# Patient Record
Sex: FEMALE | Race: WHITE | Employment: UNEMPLOYED | URBAN - METROPOLITAN AREA
[De-identification: names, ages, dates, MRNs, and addresses within clinical notes are randomized per-mention and may not be internally consistent; named-entity substitution may affect disease eponyms.]

---

## 2018-07-05 NOTE — BH NOTE
Patient called and stated that her PCP was referring her back to our outpatient program. She states that she was doing well for a while, but she states that she has began \" obsessing\" over things. Patient scheduled for an intake appointment tomorrow.

## 2018-07-06 ENCOUNTER — HOSPITAL ENCOUNTER (OUTPATIENT)
Dept: PSYCHIATRY | Age: 33
Discharge: HOME | End: 2018-07-07
Attending: PSYCHIATRY & NEUROLOGY | Admitting: PSYCHIATRY & NEUROLOGY

## 2018-07-06 ASSESSMENT — LIFESTYLE VARIABLES: HISTORY_ALCOHOL_USE: NO

## 2018-07-06 NOTE — BH NOTE
[id number] [facility]  Outpatient Services  Diagnostic Assessment Note      Date: 7/6/18  Start Time: 1:00  End Time:  2:00    Chief Complaint:  Insomnia, ruminating, paranoia, and poor judgement. History of Illness (duration, frequency, intensity): PT reports that experienced symptoms has increased over the last two weeks. Treatment Hx: Hospitalized once in 2016 and and previously was in our outpatient PHP program January 2017. Pt sees an individual therapist and psychiatrist.     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 Trauma/Abuse Hx:  n/a  AOD Hx:  n/a    Notes:  Pt reports that her psychiatrist has suggested a higher level of care due to her recent symptoms. Pt was hesitant on beginning treatment but has decided to attend IOP 2 full days a week for 4 weeks.    Screening/Assessment Results (if applicable):  n/a    Provisional DSM-5 Diagnosis:  Bi polar I disorder     Mental Status Examination:    Appearance:  well-appearing  Behavior/Motor:  no abnormalities noted  Attitude toward examiner:  cooperative  Speech:  normal  Thought Process/Content: Logical  Affect:  mood congruent  Mood: euthymic  Level of consciousness:  within normal limits  Insight & Judgement: age appropriate   Cognition:  oriented to person, place, and time  Endings: None Reported      Discipline Responsible: /Counselor      Signature:  EULALIA Perdomo

## 2018-07-10 ENCOUNTER — HOSPITAL ENCOUNTER (OUTPATIENT)
Dept: PSYCHIATRY | Age: 33
Discharge: HOME | End: 2018-07-11
Admitting: PSYCHIATRY & NEUROLOGY

## 2018-07-10 PROCEDURE — 90791 PSYCH DIAGNOSTIC EVALUATION: CPT | Performed by: PSYCHIATRY & NEUROLOGY

## 2018-07-10 NOTE — BH NOTE
[address]                  [address] [address]                              PSYCHIATRIC EVALUATION    PATIENT NAME: Rafael Gonzalez             :        1985  MED REC NO:   [id number]                          ROOM:  ACCOUNT NO:   [de-identified]                          ADMIT DATE: 2018  PROVIDER:     Asmita Wong. Faheem Gonzalez MD    ADMISSION TO [facility]:  07/10/2018    CHIEF COMPLAINT:  The patient is a 40-year-old white female who presented  to [facility] today after she had been discharged from the Intensive Outpatient  Program on 2016 after 14 days. She presents after she stated that  she was having some more difficulties over the past month. She states she  has been feeling more agitated, sleep disrupted and more obsessive at  night. She states that she has been focusing on various things in her  life. I spoke with her psychiatrist last week and this week, and they put  her back on Risperdal a week ago and she feels like her mind is now more  settled and she is sleeping better. She has also been off work for 1 week  and has been struggling with her moods overall. She states she does want  things to get out of control like they were on her last stay in the  hospital in [facility] and feels like she may have intervened quickly enough to  decrease her symptoms. She was in our [facility] program and [facility] in 2016 and then was placed in [facility]  following that. At one point, she was hospitalized for 8 days from  2016 to 2016 and then from 2016 to 2016 for  depression with catatonic features and suicidal ideation. She was having  fatigue, depression, anxiety, and paranoia, which began 6 weeks after she  moved from [place] to [place]. 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 She came here to this area with her  and two children  and had a difficult time adapting. She has also gained weight.   She was  unable to make and concentration was good. Able  to recall three objects immediately. DIAGNOSES:  AXIS I:  Bipolar affective disorder, depressed, moderate without psychosis. AXIS II:  Deferred. AXIS III:  Unremarkable. AXIS IV:  Moderate. AXIS V:  60. PLAN:  1. We will continue with Risperdal 3 mg at night. 2.  Lamictal 200 mg daily. 3.  Trazodone 50 to 100 mg at night as needed. We will follow up with  psychiatrist for medication management. 4.  Continue in IOP. 5.  Discharge after 2 or 3 weeks. 6.  Plan is to go back to work on 07/23/2018. 7.  Continue to observe for any further decompensation.         Rodolfo Vargas MD    D: 07/10/2018 12:57:52       T: 07/10/2018 13:05:30     [email or web address]  Job#: [id number]     Doc#: [id number]    CC:

## 2018-07-10 NOTE — PLAN OF CARE
Problem: Altered Mood, Depressive Behavior  Goal: LTG-Able to verbalize and/or display a decrease in depressive symptoms  Outcome: Ongoing                                                                      Group Therapy Note    Date: 7/10/2018  Start Time: 11:00am  End Time:  12:15pm  Number of Participants: 4     Type of Group: Cognitive Skills     Patient's Goal:  Pts were directed to engage in a group directive focusing on dealing with shame, vulnerability and self-expression. Pts were encouraged to complete the sentences \"I would not like to be perceived as. Jenet Lanius Jenet Lanius \", \"I would like to be perceived as. Jenet Lanius Jenet Lanius \", \"I feel like a failure when. Jenet Lanius Jenet Lanius \" and \"1 of my strengths includes. Jenet Lanius Jenet Lanius \" Pts were then encouraged to engage in group discussion regarding these topics.      Notes:  Ajay Montoya displayed positive ability to engage in directive. She was able to identify answers to each of the questions and displayed positive effort to be an active member of the group. Status After Intervention:  Improved    Participation Level:  Active Listener and Interactive    Participation Quality: Appropriate, Attentive, Sharing and Supportive      Speech:  normal and hesitant      Thought Process/Content: Logical  Linear      Affective Functioning: Blunted      Mood: anxious and depressed      Level of consciousness:  Alert, Oriented x4 and Attentive      Response to Learning: Able to verbalize current knowledge/experience, Able to verbalize/acknowledge new learning, Able to retain information, Capable of insight and Progressing to goal      Endings: None Reported    Modes of Intervention: Education, Support, Exploration, Clarifying, Problem-solving, Activity and Limit-setting      Discipline Responsible: Psychoeducational Specialist      Signature:  Malissa Zuniga MS, ATR-BC

## 2018-07-11 ENCOUNTER — HOSPITAL ENCOUNTER (OUTPATIENT)
Dept: PSYCHIATRY | Age: 33
Discharge: HOME | End: 2018-07-12
Admitting: PSYCHIATRY & NEUROLOGY

## 2018-07-13 ENCOUNTER — HOSPITAL ENCOUNTER (OUTPATIENT)
Dept: PSYCHIATRY | Age: 33
Discharge: HOME | End: 2018-07-14
Admitting: PSYCHIATRY & NEUROLOGY

## 2018-07-13 ENCOUNTER — HOSPITAL ENCOUNTER (INPATIENT)
Dept: PSYCHIATRY | Age: 33
LOS: 3 days | Discharge: HOME | DRG: 885 | End: 2018-07-16
Attending: PSYCHIATRY & NEUROLOGY | Admitting: PSYCHIATRY & NEUROLOGY
Payer: COMMERCIAL

## 2018-07-13 DIAGNOSIS — F23 ACUTE PSYCHOSIS (HCC): ICD-10-CM

## 2018-07-13 PROCEDURE — 9990 CHARGE CONVERSION

## 2018-07-13 PROCEDURE — 99221 1ST HOSP IP/OBS SF/LOW 40: CPT | Performed by: PHYSICIAN ASSISTANT

## 2018-07-13 RX ORDER — DOCUSATE SODIUM 100 MG/1
100 CAPSULE, LIQUID FILLED ORAL DAILY PRN
Status: DISCONTINUED | OUTPATIENT
Start: 2018-07-13 | End: 2018-07-16 | Stop reason: HOSPADM

## 2018-07-13 RX ORDER — ONDANSETRON 4 MG/1
4 TABLET, ORALLY DISINTEGRATING ORAL EVERY 8 HOURS PRN
Status: DISCONTINUED | OUTPATIENT
Start: 2018-07-13 | End: 2018-07-16 | Stop reason: HOSPADM

## 2018-07-13 RX ORDER — ACETAMINOPHEN 325 MG/1
650 TABLET ORAL EVERY 4 HOURS PRN
Status: DISCONTINUED | OUTPATIENT
Start: 2018-07-13 | End: 2018-07-16 | Stop reason: HOSPADM

## 2018-07-13 RX ORDER — IBUPROFEN 400 MG/1
400 TABLET ORAL EVERY 6 HOURS PRN
Status: DISCONTINUED | OUTPATIENT
Start: 2018-07-13 | End: 2018-07-16 | Stop reason: HOSPADM

## 2018-07-13 RX ORDER — MAGNESIUM HYDROXIDE/ALUMINUM HYDROXICE/SIMETHICONE 120; 1200; 1200 MG/30ML; MG/30ML; MG/30ML
30 SUSPENSION ORAL EVERY 6 HOURS PRN
Status: DISCONTINUED | OUTPATIENT
Start: 2018-07-13 | End: 2018-07-16 | Stop reason: HOSPADM

## 2018-07-13 RX ORDER — DOCUSATE SODIUM 100 MG/1
100 CAPSULE, LIQUID FILLED ORAL DAILY
Status: DISCONTINUED | OUTPATIENT
Start: 2018-07-13 | End: 2018-07-13

## 2018-07-13 RX ORDER — DIPHENHYDRAMINE HCL 25 MG
50 TABLET ORAL NIGHTLY PRN
Status: DISCONTINUED | OUTPATIENT
Start: 2018-07-13 | End: 2018-07-16 | Stop reason: HOSPADM

## 2018-07-13 RX ORDER — BISACODYL 10 MG
10 SUPPOSITORY, RECTAL RECTAL DAILY PRN
Status: DISCONTINUED | OUTPATIENT
Start: 2018-07-13 | End: 2018-07-16 | Stop reason: HOSPADM

## 2018-07-13 RX ORDER — TRAZODONE HYDROCHLORIDE 50 MG/1
50 TABLET ORAL NIGHTLY PRN
Status: DISCONTINUED | OUTPATIENT
Start: 2018-07-13 | End: 2018-07-16 | Stop reason: HOSPADM

## 2018-07-13 RX ORDER — HALOPERIDOL 5 MG/ML
5 INJECTION INTRAMUSCULAR EVERY 6 HOURS PRN
Status: DISCONTINUED | OUTPATIENT
Start: 2018-07-13 | End: 2018-07-16 | Stop reason: HOSPADM

## 2018-07-13 RX ORDER — LAMOTRIGINE 100 MG/1
200 TABLET ORAL NIGHTLY
Status: DISCONTINUED | OUTPATIENT
Start: 2018-07-13 | End: 2018-07-16 | Stop reason: HOSPADM

## 2018-07-13 RX ORDER — HYDROXYZINE PAMOATE 50 MG/1
50 CAPSULE ORAL 3 TIMES DAILY PRN
Status: DISCONTINUED | OUTPATIENT
Start: 2018-07-13 | End: 2018-07-16 | Stop reason: HOSPADM

## 2018-07-13 RX ORDER — RISPERIDONE 1 MG/1
1 TABLET, FILM COATED ORAL NIGHTLY
Status: DISCONTINUED | OUTPATIENT
Start: 2018-07-13 | End: 2018-07-13

## 2018-07-13 RX ORDER — LAMOTRIGINE 100 MG/1
200 TABLET ORAL DAILY
Status: DISCONTINUED | OUTPATIENT
Start: 2018-07-14 | End: 2018-07-13

## 2018-07-13 RX ADMIN — LAMOTRIGINE 200 MG: 100 TABLET ORAL at 20:44

## 2018-07-13 RX ADMIN — RISPERIDONE 3 MG: 2 TABLET ORAL at 20:44

## 2018-07-13 ASSESSMENT — SLEEP AND FATIGUE QUESTIONNAIRES
DO YOU HAVE DIFFICULTY SLEEPING: NO
DO YOU USE A SLEEP AID: NO
AVERAGE NUMBER OF SLEEP HOURS: 8

## 2018-07-13 ASSESSMENT — LIFESTYLE VARIABLES: HISTORY_ALCOHOL_USE: NO

## 2018-07-13 NOTE — PAYOR INFORMATION
Patient Joy Eis:  [de-identified]  Primary AUTH/CERT:    [facility] Name:   [employer] HMO/GE/NEWHEALTH/PREFE/POS  Primary Insurance Plan Name:  [employer] POS  Primary Insurance Group Number:    Primary Insurance Plan Type: [employer] Policy Number:  [id number]

## 2018-07-13 NOTE — PLAN OF CARE
Problem: Altered Mood, Manic Behavior:  Goal: Ability to interact with others will improve  Ability to interact with others will improve   Outcome: Ongoing            Group Therapy Note     Date: 7/13/2018  Start Time: 10:00am  End Time:  11:00am  Number of Participants: 7     Type of Group: Psychoeducation     Psychoeducation/ Recreation Therapy     Patient's Goal: To achieve a relaxed state through aroma therapy and progressive muscle relaxation.      Notes: Pt was able to achieve a relaxed state. Pt is encouraged to use practices skills to manage anxiety symptoms. Status After Intervention:  Improved    Participation Level:  Active Listener and Interactive    Participation Quality: Attentive, Sharing and Supportive      Speech:  normal      Thought Process/Content: Logical      Affective Functioning: Congruent      Mood: depressed      Level of consciousness:  Alert and Oriented x4      Response to Learning: Able to retain information and Capable of insight      Endings: None Reported    Modes of Intervention: Education, Support, Socialization and Activity      Discipline Responsible: Psychoeducational Specialist      Signature:  Andre Valencia

## 2018-07-13 NOTE — BH NOTE
Patient arrived to group and has been restless, walking in and out of group. Patient stated that she has a headache. Upon speaking to patient she stated that she has been having increased anxiety and stated that she had a \" bad night last night\". She stated that last night she was \"crazy\". This writer spoke with patients  who stated that she was \" acutely psychotic\". He stated that Scott Ang believed that he was possessed by a demon and stated that her \"Soul \" . Patient was restless. He states that they contacted her psychiatrist and that they increased her Risperdal from 2 mg at HS to 3 mg at HS. He stated however that he gave her an additional mg yesterday for a total of 4 mg. He also states that he gave her 50 mg of benadryl, 200 mg of Lamictal. Patients  stated that he was trying to get her to rest.     Dr. Josue Tineo aware and patient is to be admitted when a bed is available.

## 2018-07-13 NOTE — PLAN OF CARE
Problem: Altered Mood, Manic Behavior:  Goal: Ability to interact with others will improve  Ability to interact with others will improve  Outcome: Ongoing                                                                      Group Therapy Note    Date: 7/13/2018  Start Time: 10:00am  End Time:  11:00am  Number of Participants: 8    Type of Group: Relapse Prevention    Patient's Goal:  To identify upcoming events or situations that may trigger a relapse of MH symptoms and/or substance abuse. Notes:  Pt minimally participated in group discussion and exploration. Pt was observed to be very restless and expressed that she had feeling of dread, but could not identify the source. During the group, pt asked if she was allowed to eat her snack and was observed to be eating and offering imaginary peanuts to other group members. Status After Intervention:  Decompensated    Participation Level:  Active Listener and Minimal    Participation Quality: Attentive and Inappropriate      Speech:  pressured      Thought Process/Content: Hallucinating      Affective Functioning: Incongruent      Mood: anxious      Level of consciousness:  Alert, Oriented x4 and Preoccupied      Response to Learning: Able to verbalize/acknowledge new learning, Able to retain information and Capable of insight      Endings: None Reported    Modes of Intervention: Education, Support, Socialization and Exploration      Discipline Responsible: /Counselor      Signature:  HALEY Mckeon

## 2018-07-13 NOTE — BH NOTE
Group Therapy Note     Date: 7/13/2018  Start Time: 8:30 am  End Time:  9:45 am  Number of Participants: 8     Type of Group: Psychotherapy     Patient's Goal:  To reduce depressive symptoms by participating in group discussion     Notes:  Patient did not participate in group discussion. Patient left the group after approximately 5 minutes. Patient appeared anxious and was unable to sit still.   Nursing staff informed    Status After Intervention:  Unchanged    Participation Level: None    Participation Quality: Resistant      Speech:  normal      Thought Process/Content: Linear      Affective Functioning: Congruent      Mood: anxious and depressed      Level of consciousness:  Alert      Response to Learning: Capable of insight      Endings: None Reported    Modes of Intervention: Education, Support, Socialization, Exploration, Clarifying and Problem-solving      Discipline Responsible: /Counselor      Signature:  HALEY Chatman

## 2018-07-14 LAB
A/G RATIO: 1.4 (ref 1.1–2.2)
ALBUMIN SERPL-MCNC: 4.3 G/DL (ref 3.4–5)
ALP BLD-CCNC: 71 U/L (ref 40–129)
ALT SERPL-CCNC: 11 U/L (ref 10–40)
ANION GAP SERPL CALCULATED.3IONS-SCNC: 11 MMOL/L (ref 3–16)
AST SERPL-CCNC: 15 U/L (ref 15–37)
BASOPHILS ABSOLUTE: 0 K/UL (ref 0–0.2)
BASOPHILS RELATIVE PERCENT: 0.2 %
BILIRUB SERPL-MCNC: <0.2 MG/DL (ref 0–1)
BUN BLDV-MCNC: 8 MG/DL (ref 7–20)
CALCIUM SERPL-MCNC: 9.7 MG/DL (ref 8.3–10.6)
CHLORIDE BLD-SCNC: 100 MMOL/L (ref 99–110)
CO2: 26 MMOL/L (ref 21–32)
CREAT SERPL-MCNC: 0.6 MG/DL (ref 0.6–1.1)
EOSINOPHILS ABSOLUTE: 0.1 K/UL (ref 0–0.6)
EOSINOPHILS RELATIVE PERCENT: 1.5 %
GFR AFRICAN AMERICAN: >60
GFR NON-AFRICAN AMERICAN: >60
GLOBULIN: 3 G/DL
GLUCOSE BLD-MCNC: 115 MG/DL (ref 70–99)
HCT VFR BLD CALC: 36.1 % (ref 36–48)
HEMOGLOBIN: 12.2 G/DL (ref 12–16)
LYMPHOCYTES ABSOLUTE: 2 K/UL (ref 1–5.1)
LYMPHOCYTES RELATIVE PERCENT: 26.5 %
MCH RBC QN AUTO: 28.1 PG (ref 26–34)
MCHC RBC AUTO-ENTMCNC: 33.7 G/DL (ref 31–36)
MCV RBC AUTO: 83.2 FL (ref 80–100)
MONOCYTES ABSOLUTE: 0.6 K/UL (ref 0–1.3)
MONOCYTES RELATIVE PERCENT: 7.8 %
NEUTROPHILS ABSOLUTE: 4.9 K/UL (ref 1.7–7.7)
NEUTROPHILS RELATIVE PERCENT: 64 %
PDW BLD-RTO: 14.1 % (ref 12.4–15.4)
PLATELET # BLD: 299 K/UL (ref 135–450)
PMV BLD AUTO: 7.9 FL (ref 5–10.5)
POTASSIUM REFLEX MAGNESIUM: 4.5 MMOL/L (ref 3.5–5.1)
RBC # BLD: 4.35 M/UL (ref 4–5.2)
SODIUM BLD-SCNC: 137 MMOL/L (ref 136–145)
TOTAL PROTEIN: 7.3 G/DL (ref 6.4–8.2)
WBC # BLD: 7.7 K/UL (ref 4–11)

## 2018-07-14 PROCEDURE — 82306 VITAMIN D 25 HYDROXY: CPT

## 2018-07-14 PROCEDURE — 84443 ASSAY THYROID STIM HORMONE: CPT

## 2018-07-14 PROCEDURE — 80053 COMPREHEN METABOLIC PANEL: CPT

## 2018-07-14 PROCEDURE — 85025 COMPLETE CBC W/AUTO DIFF WBC: CPT

## 2018-07-14 PROCEDURE — 99223 1ST HOSP IP/OBS HIGH 75: CPT | Performed by: PSYCHIATRY & NEUROLOGY

## 2018-07-14 PROCEDURE — 84146 ASSAY OF PROLACTIN: CPT

## 2018-07-14 PROCEDURE — 1240000000 HC EMOTIONAL WELLNESS R&B

## 2018-07-14 PROCEDURE — 36415 COLL VENOUS BLD VENIPUNCTURE: CPT

## 2018-07-14 PROCEDURE — 6370000000 HC RX 637 (ALT 250 FOR IP): Performed by: PSYCHIATRY & NEUROLOGY

## 2018-07-14 RX ADMIN — DOCUSATE SODIUM 100 MG: 100 CAPSULE, LIQUID FILLED ORAL at 06:35

## 2018-07-14 RX ADMIN — LAMOTRIGINE 200 MG: 100 TABLET ORAL at 21:58

## 2018-07-14 RX ADMIN — RISPERIDONE 3 MG: 2 TABLET ORAL at 21:58

## 2018-07-14 ASSESSMENT — PAIN SCALES - GENERAL: PAINLEVEL_OUTOF10: 0

## 2018-07-14 NOTE — PLAN OF CARE
Problem: Altered Mood, Manic Behavior:  Goal: Able to verbalize decrease in frequency and intensity of racing thoughts  Able to verbalize decrease in frequency and intensity of racing thoughts   Outcome: Ongoing  Bekah Francis has been visible in the milieu, minimally social. Patient denies current SI/HI/AVH. States \" I just can't stop my mind. \" Attended group.

## 2018-07-14 NOTE — PLAN OF CARE
Problem: Altered Mood, Manic Behavior:  Goal: Mood stable  Mood stable   Outcome: Ongoing                                                                      Group Therapy Note    Date: 7/14/2018  Start Time: 1:30pm  End Time: 2:30pm  Number of Participants: 8    Type of Group: Psychoeducation    Recreation Therapy     Patient's Goal: To read a mindfulness article and discuss how to be kinder to self. Notes: Pt engaged in group session and wrote a kind letter to self.      Status After Intervention:  Improved    Participation Level: Minimal    Participation Quality: Resistant      Speech:  normal      Thought Process/Content: Linear      Affective Functioning: Flat      Mood: depressed      Level of consciousness:  Alert and Oriented x4      Response to Learning: Able to retain information and Capable of insight      Endings: None Reported    Modes of Intervention: Education, Support, Socialization and Activity      Discipline Responsible: Psychoeducational Specialist      Signature:  Liberty Clement

## 2018-07-15 LAB
PROLACTIN: 154.1 NG/ML
TSH REFLEX: 1.7 UIU/ML (ref 0.27–4.2)
VITAMIN D 25-HYDROXY: 26.6 NG/ML

## 2018-07-15 PROCEDURE — 1240000000 HC EMOTIONAL WELLNESS R&B

## 2018-07-15 PROCEDURE — 6370000000 HC RX 637 (ALT 250 FOR IP): Performed by: PSYCHIATRY & NEUROLOGY

## 2018-07-15 RX ADMIN — LAMOTRIGINE 200 MG: 100 TABLET ORAL at 21:07

## 2018-07-15 RX ADMIN — RISPERIDONE 3 MG: 2 TABLET ORAL at 21:08

## 2018-07-15 ASSESSMENT — PATIENT HEALTH QUESTIONNAIRE - PHQ9: SUM OF ALL RESPONSES TO PHQ QUESTIONS 1-9: 9

## 2018-07-15 ASSESSMENT — SLEEP AND FATIGUE QUESTIONNAIRES
AVERAGE NUMBER OF SLEEP HOURS: 7
DIFFICULTY STAYING ASLEEP: YES
SLEEP PATTERN: INSOMNIA
DIFFICULTY FALLING ASLEEP: YES
RESTFUL SLEEP: NO
DIFFICULTY ARISING: YES
DO YOU USE A SLEEP AID: YES

## 2018-07-15 NOTE — PLAN OF CARE
Problem: Altered Mood, Manic Behavior:  Goal: Ability to demonstrate self-control will improve  Ability to demonstrate self-control will improve   Outcome: Ongoing  Date: 7/15/2018  Start Time: 11:15 A. M. End Time:  12:00 P. M. Number of Participants: 11    Type of Group: Psychoeducation    Wellness Binder Information  Module Name:  Tab II Module I  Session Number:  Stress management    Patient's Goal:  To identify coping skills to manage stress(traumatic)    Notes:  Pt actively participated in group and  Shared  Ideas and  [place]. Pt engaged in the  group exercises about lessening anxiety and managing her symptoms. Status After Intervention:  Improved    Participation Level:  Active Listener    Participation Quality: Appropriate      Speech:  normal      Thought Process/Content: Logical      Affective Functioning: Congruent      Mood: anxious/depressed      Level of consciousness:  lethargic      Response to Learning: Able to verbalize current knowledge/experience      Endings: None Reported    Modes of Intervention: Education      Discipline Responsible: /Counselor      Signature:  JIMENA Beaulieu

## 2018-07-15 NOTE — PLAN OF CARE
Problem: Altered Mood, Manic Behavior:  Goal: Mood stable  Mood stable   Outcome: Ongoing                                                                      Group Therapy Note    Date: 7/15/2018  Start Time: 1:30pm  End Time: 2:45pm  Number of Participants: 7    Type of Group: Psychoeducation    Psychoeducation/ Recreation Therapy     Patient's Goal: To achieve a relaxed state through aroma therapy. Notes: Pt engaged in group session. Pt achieved a relaxed state. Status After Intervention:  Improved    Participation Level:  Active Listener and Interactive    Participation Quality: Appropriate      Speech:  normal      Thought Process/Content: Linear      Affective Functioning: Flat      Mood: depressed      Level of consciousness:  Alert and Oriented x4      Response to Learning: Able to retain information and Capable of insight      Endings: None Reported    Modes of Intervention: Education, Support, Socialization and Activity      Discipline Responsible: Psychoeducational Specialist      Signature:  Soco Brock

## 2018-07-15 NOTE — H&P
She was hospitalized on two different occasions in 11/2016 for  depression and catatonic features and suicidal ideation. She is not  experiencing catatonia at this time. 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 She came to this area with her  and two children had  difficult time adapting. She was unable to make a decision, then was  obsessing about a variety of things and having intrusive thoughts about  hurting her kids back in 2016. Those are not occurring at this time. She  saw a therapist in [place] a year and a half ago. Currently, she is  seeing Dr. Wilbur Ornelas, psychiatrist.    CURRENT MEDS:  3 mg at night Risperdal, Lamictal 200 mg daily, and  trazodone 50 to 100 mg as needed for sleep. SUBSTANCE USE:  Denied. FAMILY PSYCH HISTORY:  Depression throughout the family. LEGAL ISSUES:  None. TRAUMA HISTORY:  None. SOCIAL HISTORY:  Lives in [address] [place] [address] with her  and two kids ages  3 and 3. 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 MEDICAL HISTORY:  Vera-Danlos syndrome. MEDICATION ALLERGIES:  PENICILLIN. LABORATORIES:  Reviewed. Thyroid, TSH within normal limits. Comprehensive  panel shows elevated glucose 115. Prolactin is highly elevated at 154.1 on  07/15/2016. VITAL SIGNS:  Temperature 98.6, pulse 67, respirations 16, and blood  pressure 107/63. REVIEW OF SYSTEMS, PHYSICAL EXAM:  Per Savannah Wallace, 07/14/2015. MENTAL STATUS:  The patient is a 61-year-old white female who is very  pleasant and cooperative. She engaged easily. She made good eye contact. She did not appear psychotic at this time. She had no auditory or visual  hallucinations. No threats to harm self or others. Thoughts were coherent  and logical.  Speech is normal rate and tone. She said her mood was okay. Affect was relatively bright. No abnormal movements. Normal gait. Fund  of knowledge and language intact.
arise during this admission.     Lily Nguyen PA-C 4:51 PM 7/13/2018

## 2018-07-16 VITALS
SYSTOLIC BLOOD PRESSURE: 127 MMHG | RESPIRATION RATE: 16 BRPM | HEART RATE: 79 BPM | TEMPERATURE: 97 F | DIASTOLIC BLOOD PRESSURE: 88 MMHG

## 2018-07-16 PROBLEM — F31.2 SEVERE MANIC BIPOLAR 1 DISORDER WITH PSYCHOTIC BEHAVIOR (HCC): Status: ACTIVE | Noted: 2018-07-16

## 2018-07-16 PROCEDURE — 99239 HOSP IP/OBS DSCHRG MGMT >30: CPT | Performed by: PSYCHIATRY & NEUROLOGY

## 2018-07-16 PROCEDURE — 5130000000 HC BRIDGE APPOINTMENT

## 2018-07-16 RX ORDER — RISPERIDONE 3 MG/1
3 TABLET, FILM COATED ORAL NIGHTLY
Qty: 30 TABLET | Refills: 0 | Status: SHIPPED | OUTPATIENT
Start: 2018-07-16

## 2018-07-16 RX ORDER — HYDROXYZINE PAMOATE 50 MG/1
50 CAPSULE ORAL 3 TIMES DAILY PRN
Qty: 60 CAPSULE | Refills: 0 | Status: SHIPPED | OUTPATIENT
Start: 2018-07-16 | End: 2018-07-30

## 2018-07-16 ASSESSMENT — PAIN SCALES - GENERAL: PAINLEVEL_OUTOF10: 0

## 2018-07-16 NOTE — PROGRESS NOTES
Patients chart was reviewed and collaborated with staff as well around discharge planning. Reviewed with the patient. Dictated discharge summary. At this time patient is not probateable or holdable and is not suicidal/homicidal. Spent 35 minutes on discharge, and more then 50 % of that time was spent with counseling patient on discharge goals.

## 2018-07-16 NOTE — BH NOTE
Comments: + interactions, + contribution, and + engagement.  Accomplished goal    Time: 8904-8563      Type of Group: WRAPUP GROUP      Level of Participation: Active particpant
Pt denied  having SI/HI ideations. Pt reports that she struggled  being in control vs. losing control. Pt reports that   she  Is feeling overwhelmed  with  life changes. Pt is  Stated that is an only child. 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 Pt  Stated that  she is  very anxious, not depressed. Pt reports that  her mood   has changed  In the last  two days. Pt also reports that  changes are  always stressful. Pt reports that  parents were  rigid and controlling. Pt reports that she   can be  dependent of direction or decision making from her . Pt stated  That she is bothered when people want to  Impose on her. Pt reports that she  wants  to  feel in control of her  And her environment and she struggles with  that.      Claudeen Kallman, MSW, EULALIA-S
Reports appointment with psychiatrist on Monday at 80   Dr. Mary Alice Duque- verbal consent was given to talk with her.      Psychiatrist is requesting to speak with Dr. Indra Diehl
( )  Basic information about quitting (benefits of quitting, techniques in how to quit, available resources  ( ) Referral for counseling faxed to [facility]                                           ( ) Patient refused counseling  ( X) Patient has not smoked in the last 30 days    Metabolic Screening:    No results found for: LABA1C    No results for input(s): CHOL, TRIG, HDL, LDLCALC, LABVLDL in the last 72 hours. There is no height or weight on file to calculate BMI. BP Readings from Last 2 Encounters:   11/29/16 113/76   11/26/16 115/66           Pt admitted with followings belongings:  Dentures: None  Vision - Corrective Lenses: Glasses  Hearing Aid: None  Jewelry: Ring  Body Piercings Removed: N/A  Clothing: Footwear, Shirt, Pants, Undergarments (Comment)  Were All Patient Medications Collected?: Not Applicable  Other Valuables: None   Patient oriented to surroundings and program expectations and copy of patient rights given. Received admission packet:  YES. Consents reviewed, signed YES. Patient verbalize understanding:  YEs.     Patient education on precautions: Ally Barger RN
ways to manage stress, exercise, relaxation techniques, changing routine, distraction), if not completed on admission                                                           ( )  Basic information about quitting (benefits of quitting, techniques in how to quit, available resources, if not completed on admission  ( ) Referral for counseling faxed to [facility]   (x ) Patient refused referral  ( ) Patient refused counseling  ( ) Patient refused smoking cessation medication upon discharge        Yolanda Dillard RN

## 2018-08-15 NOTE — DISCHARGE SUMMARY
[address]                  [address] [facility] [address]                                 DISCHARGE SUMMARY    PATIENT NAME: Volodymyr Diop             :        1985  MED REC NO:   [id number]                          ROOM:       [id number]  ACCOUNT NO:   [de-identified]                          ADMIT DATE: 2018  PROVIDER:     Chen aPcheco. Tony Tejada MD                  DISCHARGE DATE:  2018    DISPOSITION:  The patient being discharged home. Followup in outpatient  through [address] and Dr. Olinda Murillo. DISCHARGE MEDICATIONS:  1. Risperdal 3 mg nightly. 2.  Lamictal 100 mg daily    CONDITION ON DISCHARGE:  Stable. DISCHARGE DIAGNOSES:  Axis I:  Bipolar affective disorder, depressed, moderate without psychosis. Axis II:  Deferred. Axis III:  Unremarkable. Axis IV:  Moderate. Axis V:  55. MENTAL STATUS:  The patient is alert, oriented to person, place and time. No threats to harm self or others. No psychotic symptoms. Insight and  judgment improved. Mood was okay. Affect was bright. REASON FOR HOSPITALIZATION AND HOSPITAL COURSE:  A 43-year-old female  presented to the [facility] on 2018 and appeared confused and paranoid. She  is pacing and unable to sit still in groups. She said that she is being  tested by the devil and God also tested her routinely. She had been  ruminating at home and been off of her Risperdal for period of time because  of hyperprolactinemia. She then went back on her Risperdal and said that  she slept well and  was very concerned about her obsessive  behaviors. She has been in our program in the past 2016. She continued  with Risperdal 3 mg at night but discussed changing to [facility] or another  drug that will not cause hyperprolactinemia. She was maintained on  Lamictal 200 mg daily, trazodone 50 to 100 mg at night.   She was  hospitalized for 3 days and during that time